# Patient Record
Sex: FEMALE | Race: BLACK OR AFRICAN AMERICAN | NOT HISPANIC OR LATINO | Employment: PART TIME | URBAN - METROPOLITAN AREA
[De-identification: names, ages, dates, MRNs, and addresses within clinical notes are randomized per-mention and may not be internally consistent; named-entity substitution may affect disease eponyms.]

---

## 2021-01-21 ENCOUNTER — HOSPITAL ENCOUNTER (EMERGENCY)
Facility: OTHER | Age: 56
Discharge: HOME | End: 2021-01-21
Attending: EMERGENCY MEDICINE
Payer: MEDICAID

## 2021-01-21 VITALS
HEART RATE: 67 BPM | SYSTOLIC BLOOD PRESSURE: 123 MMHG | OXYGEN SATURATION: 100 % | WEIGHT: 132 LBS | TEMPERATURE: 98 F | BODY MASS INDEX: 22.53 KG/M2 | DIASTOLIC BLOOD PRESSURE: 60 MMHG | HEIGHT: 64 IN | RESPIRATION RATE: 16 BRPM

## 2021-01-21 DIAGNOSIS — M54.50 ACUTE BILATERAL LOW BACK PAIN WITHOUT SCIATICA: Primary | ICD-10-CM

## 2021-01-21 PROCEDURE — 99284 EMERGENCY DEPT VISIT MOD MDM: CPT | Mod: 25

## 2021-01-21 PROCEDURE — 63600175 PHARM REV CODE 636 W HCPCS: Performed by: EMERGENCY MEDICINE

## 2021-01-21 PROCEDURE — 96372 THER/PROPH/DIAG INJ SC/IM: CPT

## 2021-01-21 RX ORDER — IBUPROFEN 600 MG/1
600 TABLET ORAL EVERY 8 HOURS PRN
Qty: 30 TABLET | Refills: 0 | Status: SHIPPED | OUTPATIENT
Start: 2021-01-21 | End: 2022-07-31 | Stop reason: SDUPTHER

## 2021-01-21 RX ORDER — HYDROCODONE BITARTRATE AND ACETAMINOPHEN 5; 325 MG/1; MG/1
1 TABLET ORAL EVERY 6 HOURS PRN
Qty: 12 TABLET | Refills: 0 | Status: SHIPPED | OUTPATIENT
Start: 2021-01-21

## 2021-01-21 RX ORDER — ORPHENADRINE CITRATE 30 MG/ML
60 INJECTION INTRAMUSCULAR; INTRAVENOUS
Status: COMPLETED | OUTPATIENT
Start: 2021-01-21 | End: 2021-01-21

## 2021-01-21 RX ORDER — ORPHENADRINE CITRATE 100 MG/1
100 TABLET, EXTENDED RELEASE ORAL 2 TIMES DAILY PRN
Qty: 20 TABLET | Refills: 0 | Status: SHIPPED | OUTPATIENT
Start: 2021-01-21 | End: 2021-01-31

## 2021-01-21 RX ORDER — KETOROLAC TROMETHAMINE 30 MG/ML
15 INJECTION, SOLUTION INTRAMUSCULAR; INTRAVENOUS
Status: COMPLETED | OUTPATIENT
Start: 2021-01-21 | End: 2021-01-21

## 2021-01-21 RX ADMIN — ORPHENADRINE CITRATE 60 MG: 30 INJECTION INTRAMUSCULAR; INTRAVENOUS at 01:01

## 2021-01-21 RX ADMIN — KETOROLAC TROMETHAMINE 15 MG: 30 INJECTION, SOLUTION INTRAMUSCULAR at 01:01

## 2022-07-31 ENCOUNTER — HOSPITAL ENCOUNTER (EMERGENCY)
Facility: OTHER | Age: 57
Discharge: HOME | End: 2022-07-31
Attending: EMERGENCY MEDICINE
Payer: MEDICAID

## 2022-07-31 VITALS
BODY MASS INDEX: 23.22 KG/M2 | RESPIRATION RATE: 18 BRPM | HEART RATE: 74 BPM | SYSTOLIC BLOOD PRESSURE: 136 MMHG | WEIGHT: 136 LBS | HEIGHT: 64 IN | OXYGEN SATURATION: 98 % | TEMPERATURE: 98 F | DIASTOLIC BLOOD PRESSURE: 81 MMHG

## 2022-07-31 DIAGNOSIS — M25.522 LEFT ELBOW PAIN: ICD-10-CM

## 2022-07-31 DIAGNOSIS — M79.18 BUTTOCK PAIN: ICD-10-CM

## 2022-07-31 DIAGNOSIS — M25.562 LEFT KNEE PAIN: ICD-10-CM
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
## 2022-07-31 PROCEDURE — 99284 EMERGENCY DEPT VISIT MOD MDM: CPT | Mod: 25

## 2022-07-31 PROCEDURE — 25000003 PHARM REV CODE 250: Performed by: NURSE PRACTITIONER

## 2022-07-31 RX ORDER — ACETAMINOPHEN 325 MG/1
650 TABLET ORAL
Status: COMPLETED | OUTPATIENT
Start: 2022-07-31 | End: 2022-07-31

## 2022-07-31 RX ORDER — DEXTROMETHORPHAN HYDROBROMIDE, GUAIFENESIN 5; 100 MG/5ML; MG/5ML
650 LIQUID ORAL EVERY 8 HOURS
Qty: 20 TABLET | Refills: 0 | Status: SHIPPED | OUTPATIENT
Start: 2022-07-31

## 2022-07-31 RX ORDER — IBUPROFEN 600 MG/1
600 TABLET ORAL EVERY 8 HOURS PRN
Qty: 30 TABLET | Refills: 0 | Status: SHIPPED | OUTPATIENT
Start: 2022-07-31

## 2022-07-31 RX ADMIN — ACETAMINOPHEN 650 MG: 325 TABLET, FILM COATED ORAL at 01:07

## 2022-07-31 NOTE — ED PROVIDER NOTES
Source of History:  Patient    Chief complaint:  Knee Pain (C/o left knee pain 7/10 and left hip, and left elbow pain s/p slip and fall x 07/27/2022 . -LOC. No trauma/injury noted. Denies any other complaints. VSS  )      HPI:  Elsa Melo is a 56 y.o. female presenting for evaluation after slipping on a wet floor at [employer] 7/27. She reports left buttocks pain, left knee pain and left elbow pain.  She states that she has been taking Aleve for her pain which temporarily improves her pain.  She is ambulatory and denies pain with walking.  She states that she is here because her lower told her she needs to be evaluated as she is pursuing legal action against [employer].  After she fell she states all they did was offer her larger side soda and did not seem to care that she was injured.    This is the extent to the patients complaints today here in the emergency department.    ROS: As per HPI and below:  General: No fever.  No chills.  Eyes: No visual changes.  ENT: No sore throat. No ear pain  Head: No headache.    Chest: No shortness of breath.  Cardiovascular: No chest pain.  Abdomen: No abdominal pain.  No nausea or vomiting.  Genito-Urinary: No abnormal urination.  Neurologic: No focal weakness.  No numbness.  MSK: +buttocks pain +left knee pain +left elbow pain  Integument: No rashes or lesions. +ecchymosis  Hematologic: No easy bruising.  Endocrine: No excessive thirst or urination.    Review of patient's allergies indicates:  No Known Allergies    PMH:  As per HPI and below:  No past medical history on file.  Past Surgical History:   Procedure Laterality Date    BREAST SURGERY      GALLBLADDER SURGERY         Social History     Tobacco Use    Smoking status: Current Every Day Smoker     Packs/day: 0.50     Types: Cigarettes   Substance Use Topics    Alcohol use: Yes    Drug use: Yes     Types: Marijuana       Physical Exam:    /81 (BP Location: Right arm, Patient Position: Sitting)   Pulse 74   " Temp 98.1 °F (36.7 °C) (Oral)   Resp 18   Ht 5' 4" (1.626 m)   Wt 61.7 kg (136 lb)   LMP 03/07/2014   SpO2 98%   Breastfeeding No   BMI 23.34 kg/m²   Nursing note and vital signs reviewed.  Appearance: No acute distress.  Eyes: No conjunctival injection.  ENT: Oropharynx clear.    Chest/ Respiratory: Clear to auscultation bilaterally.  Good air movement.  No wheezes.  No rhonchi. No rales. No accessory muscle use.  Cardiovascular: Regular rate and rhythm.  No murmurs. No gallops. No rubs.  Abdomen: Soft.  Not distended.  Nontender.  No guarding.  No rebound. Non-peritoneal.  Musculoskeletal: Good range of motion all joints.  No deformities.  Neck supple.  No meningismus.  Skin: No rashes seen.  Good turgor.  No abrasions.  Closest to his lateral aspect of left knee with point tenderness.  Neurologic: Motor intact.  Sensation intact.  Cerebellar intact.  Cranial nerves intact.  Mental Status:  Alert and oriented x 3.  Appropriate, conversant    Labs that have been ordered have been independently reviewed and interpreted by myself.        Labs Reviewed - No data to display    Imaging Results          X-Ray Knee 3 View Left (Final result)  Result time 07/31/22 13:05:59    Final result by Marley Leigh MD (07/31/22 13:05:59)                 Impression:      As above.      Electronically signed by: Marley Leigh MD  Date:    07/31/2022  Time:    13:05             Narrative:    EXAMINATION:  XR KNEE 3 VIEW LEFT    CLINICAL HISTORY:  Pain in left knee    TECHNIQUE:  AP, lateral, and Merchant views of the left knee were performed.    COMPARISON:  None    FINDINGS:  No acute fracture or bony destructive process is seen.  Alignment is preserved.  No suprapatellar effusion is seen.                                Initial Impression/ Differential Dx:  Urgent evaluation of 56 y.o. female presenting with left buttocks pain, left knee pain and left elbow pain after a slip and fall on a wet floor at [employer] a few " days ago. Patient is afebrile, not toxic appearing and hemodynamically stable.  On exam patient has no cervical, thoracic or lumbar midline tenderness.  She has mild tenderness palpation of the left buttocks with no skin changes.  Do not feel like imaging is indicated this time.  Will patient complains of left elbow pain, she has no epicondyle or olecranon tenderness with full range of motion.  Do not feel like imaging is indicated at this time.  Patient ambulatory in triage with full range of motion of left knee.  Point tenderness to lateral aspect of the patella with point tenderness where there is ecchymosis and a small abrasion noted.  Will obtain plain film, treat with Tylenol and continue to reassess.    Differential Diagnosis includes, but is not limited to:  Fracture, dislocation, compartment syndrome, nerve injury/palsy, vascular injury, DVT, rhabdomyolysis, hemarthrosis, septic joint, cellulitis, bursitis, muscle strain, ligament tear/sprain, laceration, foreign body, abrasion, soft tissue contusion, osteoarthritis.      MDM:    No acute osseous abnormalities identified on x-ray.  Patient states that she is feeling better and is ready to go.  Counseled patient that if she persists she can follow-up with orthopedics.  Patient is amenable spine and discharged home good condition. Patient educated on signs and symptoms to monitor for and when to return to ED. Patient verbalized understanding agrees with treatment plan. All questions and concerns addressed.                        Diagnostic Impression:    1. Fall on same level from slipping, initial encounter    2. Left knee pain    3. Left elbow pain    4. Buttock pain         ED Disposition Condition    Discharge Stable          ED Prescriptions     Medication Sig Dispense Start Date End Date Auth. Provider    ibuprofen (ADVIL,MOTRIN) 600 MG tablet Take 1 tablet (600 mg total) by mouth every 8 (eight) hours as needed for Pain. 30 tablet 7/31/2022  Francine MIX  ANABELLE Francis    acetaminophen (TYLENOL) 650 MG TbSR Take 1 tablet (650 mg total) by mouth every 8 (eight) hours. 20 tablet 7/31/2022  Francine Francis NP        Follow-up Information     Follow up With Specialties Details Why Contact Info    Jazzmine Shelley MD Internal Medicine In 2 days  [address] [address]  [phone]             Francine Francis NP  07/31/22 [id number]

## 2022-07-31 NOTE — FIRST PROVIDER EVALUATION
Medical screening exam completed.  I have conducted a focused provider triage encounter, findings are as follows:    Brief history of present illness:  55 yo female presenting with left knee and left buttocks pain after slipping on the wet floor at [facility] 7[place]27. She has a bruise on left knee but denies pain with ambulation. She has been taking aleve for pain which temporarily helps. She is here because her  told her that she needs to be evaluated.     There were no vitals filed for this visit.    Pertinent physical exam:  Ambulatory in triage, no acute distress, ecchymosis but no edema to left knee    Brief workup plan:  Xray of left knee, tylenol    Preliminary workup initiated; this workup will be continued and followed by the physician or advanced practice provider that is assigned to the patient when roomed.